# Patient Record
Sex: MALE | Race: AMERICAN INDIAN OR ALASKA NATIVE
[De-identification: names, ages, dates, MRNs, and addresses within clinical notes are randomized per-mention and may not be internally consistent; named-entity substitution may affect disease eponyms.]

---

## 2017-03-29 NOTE — EDM.PDOC
ED HPI Skin/Rash





- General


Chief Complaint: Laceration


Stated Complaint: IN BY AMBULANCE


Time Seen by Provider: 03/29/17 01:05


Source: Reports: Patient, EMS


History Limitations: Reports: No limitations





- History of Present Illness


INITIAL COMMENTS - FREE TEXT/NARRATIVE: 





28 yo Native Male c/o falling onto right hand outside in his yard approx. 

30mins. ago.  Pt. c/o right third knuckle bleeding


Symptom Onset Date: 03/29/17


Symptom Onset Time: 00:30


Timing: Reports: still present


Location, Skin: Reports: upper extremity, right


Quality: Reports: Ache


Severity: moderate


Known Identified Source: no


Place of Occurrence: home


Sick Contact: no


Associated Symptoms: Reports: no other symptoms


Similar Symptoms Previously: no


Recent Medical Care: no





- Related Data


 Allergies











Allergy/AdvReac Type Severity Reaction Status Date / Time


 


No Known Allergies Allergy   Verified 03/29/17 01:12











Home Meds: 


 Ambulatory Orders











 Medication  Instructions  Recorded  Confirmed


 


. [No Known Home Meds]  03/29/17 03/29/17














ED ROS GENERAL





- Review of Systems


Review Of Systems: See Below


Constitutional: Reports: no symptoms


HEENT: Reports: No symptoms


Respiratory: Reports: No Symptoms


Cardiovascular: Reports: No symptoms


Endocrine: Reports: no symptoms


GI/Abdominal: Reports: No symptoms


: Reports: no symptoms


Musculoskeletal: Reports: hand pain (right dorsal knuckle)


Skin: Reports: other (laceration over thid knuckle)


Neurological: Reports: No Symptoms


Psychiatric: Reports: No symptoms


Hematologic/Lymphatic: Reports: no symptoms


Immunologic: Reports: no symptoms





ED EXAM, SKIN/RASH


Exam: See Below


Exam Limited By: No limitations


General Appearance: alert, WD/WN, obese


Eye Exam: bilateral eye: PERRL


Ears: normal external exam


Nose: normal inspection


Throat/Mouth: Normal inspection


Head: atraumatic


Neck: normal inspection


Respiratory/Chest: no respiratory distress


Cardiovascular: normal peripheral pulses


GI/Abdominal: normal bowel sounds


Back Exam: normal inspection


Extremities: other (right hand 3rd knuckle)


Neurological: alert, oriented


Psychiatric: normal affect


Skin: Other (laceration flap over right 3rd knuckle)


Location, Skin: upper extremity, right





ED SKIN PROCEDURES





- Laceration/Wound Repair


  ** Right Dorsal Hand


Lac/wound length in cm: 3.5


Appearance: irregular, clean


Distal NVT: neuro & vascular intact, other (3rd dorsal tendon tear)


Anesthetic type: local


Local anesthesia - Lidocaine (Xylocaine): 1% plain (8cc)


Local anesthetic volume: other (8cc)


Skin prep: chlorhexidine (hibiciens)


Exploration/Debridement/Repair: wound explored


Closed with: sutures


Suture size: 3-0


# of sutures: 4


Repaired with: vicryl


Drain placement: No


Tetanus status addressed: Yes


Complications: No





Course





- Vital Signs


Last Recorded V/S: 


 Last Vital Signs











Temp  36.0 C   03/29/17 01:01


 


Pulse  119 H  03/29/17 01:01


 


Resp  18   03/29/17 01:01


 


BP  147/101 H  03/29/17 01:01


 


Pulse Ox  97   03/29/17 01:01














- Orders/Labs/Meds


Orders: 


 Active Orders 24 hr











 Category Date Time Status


 


 Vaccines to be Administered [RC] PER UNIT ROUTINE Care  03/29/17 01:11 Active


 


 Hand 2V Rt [CR] Urgent Exams  03/29/17 01:10 Taken











Meds: 


Medications














Discontinued Medications














Generic Name Dose Route Start Last Admin





  Trade Name Kim  PRN Reason Stop Dose Admin


 


Diphtheria/Tetanus/Acell Pertussis  0.5 ml  03/29/17 01:10  03/29/17 01:15





  Adacel  IM  03/29/17 01:11  0.5 ml





  .ONCE ONE   Administration


 


Lidocaine HCl  30 ml  03/29/17 01:30  03/29/17 01:38





  Xylocaine-Mpf 1%  INJECT  03/29/17 01:31  30 ml





  ONETIME ONE   Administration














Departure





- Departure


Time of Disposition: 02:06


Disposition: Home, Self-Care 01


Condition: fair


Clinical Impression: 


Hand laceration involving tendon


Qualifiers:


 Encounter type: initial encounter Laterality: right Qualified Code(s): 

S61.411A - Laceration without foreign body of right hand, initial encounter; 

S66.921A - Laceration of unspecified muscle, fascia and tendon at wrist and 

hand level, right hand, initial encounter





Instructions:  VIS, Diphtheria, Tetanus, and Pertussis (DTaP) - CDC, Laceration 

Care, Adult, Easy-to-Read


Forms:  ED Department Discharge


Additional Instructions: 


keep wound clean and dry


Call Orthopedic Clinic for Appt. ASAP for right dorsal 3rd finger tendon tear


Take oral Antibiotic as prescribed Keflex 500mg BID # 20


Take Pain Medication as prescribed Tramadol 50mg Take 1 TID # 30





Keep Hand Splint in place until seen by Orthopedics ( ASAP)





- My Orders


Last 24 Hours: 


My Active Orders





03/29/17 01:10


Hand 2V Rt [CR] Urgent 





03/29/17 01:11


Vaccines to be Administered [RC] PER UNIT ROUTINE 














- Assessment/Plan


Last 24 Hours: 


My Active Orders





03/29/17 01:10


Hand 2V Rt [CR] Urgent 





03/29/17 01:11


Vaccines to be Administered [RC] PER UNIT ROUTINE

## 2023-03-28 ENCOUNTER — HOSPITAL ENCOUNTER (EMERGENCY)
Dept: HOSPITAL 43 - DL.ED | Age: 33
Discharge: HOME | End: 2023-03-28
Payer: COMMERCIAL

## 2023-03-28 VITALS — HEART RATE: 72 BPM | DIASTOLIC BLOOD PRESSURE: 115 MMHG | SYSTOLIC BLOOD PRESSURE: 159 MMHG

## 2023-03-28 DIAGNOSIS — Z72.0: ICD-10-CM

## 2023-03-28 DIAGNOSIS — R10.13: Primary | ICD-10-CM

## 2023-03-28 DIAGNOSIS — F41.9: ICD-10-CM

## 2023-03-28 DIAGNOSIS — F15.10: ICD-10-CM

## 2023-03-28 LAB
AMPHET UR QL SCN: NEGATIVE
AMPHET UR QL SCN: POSITIVE
AMPHETAMINES UR QL SCN>500 NG/ML: NEGATIVE
ANION GAP SERPL CALC-SCNC: 14.9 MEQ/L (ref 7–13)
APAP SERPL-SCNC: 0 UG/ML
BARBITURATES UR QL SCN: NEGATIVE
CHLORIDE SERPL-SCNC: 102 MMOL/L (ref 98–107)
EGFRCR SERPLBLD CKD-EPI 2021: 99 ML/MIN (ref 60–?)
MDMA UR QL SCN: NEGATIVE
OXYCODONE UR QL SCN: NEGATIVE
PCP UR QL SCN: NEGATIVE
SODIUM SERPL-SCNC: 136 MMOL/L (ref 136–145)
TRICYCLICS UR QL SCN: NEGATIVE

## 2023-03-28 PROCEDURE — C9113 INJ PANTOPRAZOLE SODIUM, VIA: HCPCS
